# Patient Record
Sex: MALE | Race: WHITE | NOT HISPANIC OR LATINO | URBAN - METROPOLITAN AREA
[De-identification: names, ages, dates, MRNs, and addresses within clinical notes are randomized per-mention and may not be internally consistent; named-entity substitution may affect disease eponyms.]

---

## 2018-04-27 ENCOUNTER — HOSPITAL ENCOUNTER (OUTPATIENT)
Dept: NEUROLOGY | Facility: CLINIC | Age: 23
Setting detail: THERAPIES SERIES
Discharge: STILL ADMITTED OR ADMINISTRATIVE | End: 2018-04-27
Attending: NURSE PRACTITIONER

## 2018-04-27 DIAGNOSIS — G44.309 POST-CONCUSSION HEADACHE: ICD-10-CM

## 2018-04-27 DIAGNOSIS — F07.81 POST CONCUSSION SYNDROME: ICD-10-CM

## 2021-06-01 VITALS — WEIGHT: 231 LBS

## 2021-06-17 NOTE — PROGRESS NOTES
"Assessment:     1. Concussion, without loss of consciousness.    2. Post concussion syndrome  3. Dizziness  4. Headaches    Plan:     Cognitive Impairment- Neuropsychological assessment (2 hours), rest, slow return to work/school, labs  Pain control for headaches - Tylenol only due to rebound headaches, [place]/blue tinted glasses  Dizziness and headache - PT to evaluate and treat  Vision abnormalities - PT to evaluate and treat, [place]/Blue colored glasses  Sleeping Problems-monitor, sleep hygiene   Anxiety due to concussion - monitor  Decreased concentration and focus - monitor  Return to Work/School- has not taken any time off of work    Subjective:          He is a 22 y.o. male who presents with a blunt/closed head injury which he sustained while playing football on 9/2017. 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 He reports that almost every game he has moments of loss of time for only a couple of seconds. The point of impact was helmet to helmet, some knee to helmet. Since the injury, he has had a headache. 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 He has had several previous head injuries. First symptoms was immediately after a game in September where he has HA, fatigue, decreased focus, and \"groggy feeling\". This went away for awhile but has come back and his symptoms are more severe now. With regard to cognitive symptoms, he endorsed significant ongoing concerns with his memory and that he has difficulties with attention and concentration as well as slowed thinking. In terms of emotional symptoms, he noted that in general he feels more emotional. He reports feeling more. Finally, with regard to physical symptoms, he endorsed significant ongoing headaches noting that he has headaches continuously, they started out intermittently but for the last month have been pretty much continuously. He " indicated that approximately every couple days these headaches are particularly bad such that he would rate them as a 6 on a 1-10 rating scale. On average he would describe his headaches as being at about a 4/10. At his best his headaches are a 2/10. The patient reports hard classes like math make his symptoms worse, and rest makes his symptoms better. He indicated that he does not take any OTC medication. He stated also that he experiences nausea without vomiting, blurred vision, fatigue, sensitivity to light, numbness in forehead. He also described sleep disturbance. He experiences drowsiness, is sleeping the same as usual, does have difficulty falling asleep and when he wakes he sometimes feel rested.    Patient was referred to the concussion clinic by the  at his college. The patient was born in [place]. He has been living in [place] for the last three years for college. He is currently living with 4 other guys from the team in a house .     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  The concussion symptoms are not limiting his ability to work, but he really does not do much while he is at work. His concussion is not work related. Personal interests include playing football, sports, hanging out with friends. The patient has been able to do these activities since his injury. He normally exercises at least twice a day, and has been able to exercise since the injury. He has never smoked, drinks socially, and does not drink products with caffeine. He is currently driving, has not noted anything different with his driving. 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 He reports having great grades through high school and college. He reports learning best by watching and doing. He denies any developmental problems, learning disabilities, or history of ADHD. He does not have any culture or Religion  concerns regarding his treatment. The patient denies being a victim of physical, emotional, financial, or sexual abuse. There is not currently any evidence of a blow to the head. He is majoring in [employer].    The patient did not see his primary after the injury he has been being seen by the  at his school, who instructed the patient to come to this clinic. The patient reports that his activities since the injury have been back to normal.  Patient reports being 100 percent back to his normal activity.  The patient denies any unexplained weight loss or gain. The patient denies that the symptoms wake him up at night. He denies feeling down, depressed, or hopeless. The patient denies being bothered by having little interest or pleasure in doing things.     Physical Symptoms:  Headache-Yes, located in back of head and in temporal area, described as dull pressure  Nausea- Yes  Vomiting - No  Balance problems - No  Dizziness - No  Visual problems - No, he has had blurry vision in the past  Fatigue - Yes  Sensitivity to light - Yes  Sensitivity to sound - No  Numbness/tingling - Yes, he has ringing in his ears and his forehead will feel numb and tingly, this happens a couple times a week      Cognitive Symptoms  Feeling mentally foggy - Yes  Feeling slowed down - Yes  Difficulty Concentrating- Yes  Difficulty remembering - Yes    Emotional Symptoms  Irritability - No  Sadness- Yes  More emotional - Yes  Nervousness/anxiety - No      Psychiatric History:  Anxiety - No  Depression - No  Sleep Disorders - No    Family Psychiatric History:  None      Sleep History:  Drowsiness- Yes  Sleep less than usual - No  Sleep more than usual - No  Trouble falling asleep - Yes  Does the patient wake feeling rested - No      Previous concussions  Yes  Patient has been playing football, baseball, and basketball and since the 8th grade, he only plays football at the college level, he also did martial arts for 5  years     Headaches  The patient does not have any history of migraines. The patient states that his family also does not have a history of migraines    There are no active problems to display for this patient.    No past medical history on file.  No past surgical history on file.  No family history on file.  No current outpatient prescriptions on file.     No current facility-administered medications for this encounter.        Allergies not on file  Social History     Social History     Marital status: Single     Spouse name: N/A     Number of children: N/A     Years of education: N/A     Occupational History     Not on file.     Social History Main Topics     Smoking status: Not on file     Smokeless tobacco: Not on file     Alcohol use Not on file     Drug use: Not on file     Sexual activity: Not on file     Other Topics Concern     Not on file     Social History Narrative       The following portions of the patient's history were reviewed and updated as appropriate: allergies, current medications, past family history, past medical history, past social history, past surgical history and problem list.  Review of Systems  A comprehensive review of systems was negative except for: headache    Objective:       Vitals:    04/27/18 1358   BP: 149/61   Pulse: 60   Weight: (!) 231 lb (104.8 kg)       Imaging:  CT Head: Not obtained    Discussion was held with the patient today regarding concussion in general including types of injury, symptoms that are common, treatment and variability in time to recover. I also provided written information about concussion and symptoms that would apply to him in his concussion folder. Education about concussion symptoms and length of time it would take the patient to recover was also given to the patient.  I have reassured the patient his symptoms are very common when a concussion is present and will improve with time. We discussed the risks and benefits of the medication including  risk of worsening depression with medication adjustments and even the possibility of emergence of suicidality      Total time spent with the patient today was 60 minutes with greater than 50% of the time spent in counseling and care coordination. The patient will return in about 3 weeks to this clinic for further assessment and treatment. He agrees to call before then with any questions, concerns or problems. We will assess for the appropriateness of possible psychotropic medication trials/changes. The patient will seek out appropriate emergency services should that become necessary.I will be available if any questions, concerns, or problems that arise.     Physical Exam: General appearance: alert, appears stated age, cooperative and no distress. Yes  Sensitivity to light. Yes  Head: Normocephalic, without obvious abnormality, atraumatic Yes  Neck:  Full ROM  Yes with pain or stiffness No    Neurologic:   Mental status: Alert, oriented, thought content appropriate, affect: mood-congruent. Recent and remote memory grossly intact.  Yes  Speech is clear and fluent with no obvious word finding or paraphasic errors. Yes  Pupils are equal and react to light direct and consensual accommodation.Yes  EOMs are intact   Yes  nystagmus. Yes   Exophoria/exotropia noted. No  Visual fields are grossly full. Yes  VOR grossly intact. Yes  Saccade and smooth pursuit grossly intact. No  Full facial movements, Yes  Tongue protrudes midline soft palate rises symmetrically. Yes  Shoulder shrug is intact. Yes  Strength is 5/5, No pronator drift. Yes  Accurate finger to nose and sensation is intact to double simultaneous stimulation.Yes   Reflexes are symmetrical Yes  Toes are down going. Yes  Romberg is positive   Able to toe, heel, and tandem walk without difficulty Yes      Mental Status Examination  Patient is casually dressed and seated for evaluation. He is cooperative with questioning and eye contact is good. He is fully engaged in  conversation today. He is alert and fully oriented. Speech is normal. Thought processes normal with normal prehension and expression. Thoughts are organized and linear. Content is pertinent to the conversation and without evidence of auditory or visual hallucinations. No delusional ideation. Affect/mood is euthymic-bright, even. Gen. fund of knowledge, insight and memory are normal.

## 2021-06-17 NOTE — PROGRESS NOTES
(New pt. How did the concussion happen and a date?) How have you been doing since we last saw you? any concerns? Not concussion its more sore concussion symptoms.Going on for about 2-3 months      Current Symptoms : Yes